# Patient Record
Sex: FEMALE | Race: WHITE | NOT HISPANIC OR LATINO | Employment: FULL TIME | URBAN - METROPOLITAN AREA
[De-identification: names, ages, dates, MRNs, and addresses within clinical notes are randomized per-mention and may not be internally consistent; named-entity substitution may affect disease eponyms.]

---

## 2021-03-12 ENCOUNTER — TRANSCRIBE ORDERS (OUTPATIENT)
Dept: ADMINISTRATIVE | Facility: HOSPITAL | Age: 61
End: 2021-03-12

## 2021-03-12 DIAGNOSIS — R00.0 TACHYCARDIA: ICD-10-CM

## 2021-03-12 DIAGNOSIS — R06.09 DOE (DYSPNEA ON EXERTION): Primary | ICD-10-CM

## 2021-03-16 ENCOUNTER — HOSPITAL ENCOUNTER (OUTPATIENT)
Dept: RESPIRATORY THERAPY | Facility: HOSPITAL | Age: 61
Discharge: HOME | End: 2021-03-16
Admitting: NURSE PRACTITIONER

## 2021-03-16 DIAGNOSIS — R06.09 DOE (DYSPNEA ON EXERTION): ICD-10-CM

## 2021-03-16 DIAGNOSIS — R00.0 TACHYCARDIA: ICD-10-CM

## 2021-03-16 PROCEDURE — 93225 XTRNL ECG REC<48 HRS REC: CPT

## 2021-03-19 PROCEDURE — 93227 XTRNL ECG REC<48 HR R&I: CPT | Performed by: INTERNAL MEDICINE

## 2023-02-24 ENCOUNTER — HOSPITAL ENCOUNTER (EMERGENCY)
Facility: HOSPITAL | Age: 63
Discharge: HOME | End: 2023-02-24
Attending: EMERGENCY MEDICINE | Admitting: EMERGENCY MEDICINE
Payer: COMMERCIAL

## 2023-02-24 VITALS
OXYGEN SATURATION: 98 % | TEMPERATURE: 98.1 F | DIASTOLIC BLOOD PRESSURE: 64 MMHG | WEIGHT: 209.88 LBS | HEART RATE: 60 BPM | RESPIRATION RATE: 17 BRPM | SYSTOLIC BLOOD PRESSURE: 139 MMHG | BODY MASS INDEX: 33.73 KG/M2 | HEIGHT: 66 IN

## 2023-02-24 DIAGNOSIS — R10.9 ACUTE LEFT FLANK PAIN: Primary | ICD-10-CM

## 2023-02-24 LAB
BACTERIA UR QL AUTO: ABNORMAL /HPF
BILIRUB UR QL STRIP: NEGATIVE
CLARITY UR: ABNORMAL
COLOR UR: YELLOW
GLUCOSE UR STRIP-MCNC: NEGATIVE MG/DL
HGB UR QL STRIP.AUTO: ABNORMAL
HYALINE CASTS UR QL AUTO: ABNORMAL /LPF
KETONES UR QL STRIP: ABNORMAL
LEUKOCYTE ESTERASE UR QL STRIP.AUTO: ABNORMAL
NITRITE UR QL STRIP: NEGATIVE
PH UR STRIP.AUTO: 5.5 [PH] (ref 5–8)
PROT UR QL STRIP: ABNORMAL
RBC # UR STRIP: ABNORMAL /HPF
REF LAB TEST METHOD: ABNORMAL
SP GR UR STRIP: >=1.03 (ref 1–1.03)
SQUAMOUS #/AREA URNS HPF: ABNORMAL /HPF
UROBILINOGEN UR QL STRIP: ABNORMAL
WBC # UR STRIP: ABNORMAL /HPF

## 2023-02-24 PROCEDURE — 99283 EMERGENCY DEPT VISIT LOW MDM: CPT

## 2023-02-24 PROCEDURE — 81001 URINALYSIS AUTO W/SCOPE: CPT | Performed by: EMERGENCY MEDICINE

## 2024-03-01 DIAGNOSIS — I10 ESSENTIAL HYPERTENSION: Primary | ICD-10-CM

## 2024-03-01 DIAGNOSIS — M51.36 DDD (DEGENERATIVE DISC DISEASE), LUMBAR: ICD-10-CM

## 2024-03-01 DIAGNOSIS — H81.09 MENIERE'S DISEASE, UNSPECIFIED LATERALITY: ICD-10-CM

## 2024-03-01 RX ORDER — LISINOPRIL AND HYDROCHLOROTHIAZIDE 20; 12.5 MG/1; MG/1
1 TABLET ORAL DAILY
COMMUNITY
Start: 2024-02-21

## 2024-03-01 RX ORDER — OMEPRAZOLE 20 MG/1
20 CAPSULE, DELAYED RELEASE ORAL DAILY
COMMUNITY

## 2024-03-01 RX ORDER — ASCORBIC ACID 500 MG
500 TABLET ORAL DAILY
COMMUNITY

## 2024-03-01 RX ORDER — SUCRALFATE 1 G/1
1 TABLET ORAL 4 TIMES DAILY
COMMUNITY

## 2024-03-01 RX ORDER — ZINC ACETATE 50 MG/1
50 CAPSULE ORAL DAILY
COMMUNITY

## 2024-03-06 ENCOUNTER — OFFICE VISIT (OUTPATIENT)
Dept: CARDIOLOGY | Facility: CLINIC | Age: 64
End: 2024-03-06
Payer: COMMERCIAL

## 2024-03-06 VITALS
WEIGHT: 195 LBS | HEIGHT: 66 IN | SYSTOLIC BLOOD PRESSURE: 135 MMHG | RESPIRATION RATE: 18 BRPM | BODY MASS INDEX: 31.34 KG/M2 | OXYGEN SATURATION: 97 % | DIASTOLIC BLOOD PRESSURE: 76 MMHG | HEART RATE: 58 BPM

## 2024-03-06 DIAGNOSIS — E11.9 TYPE 2 DIABETES MELLITUS WITHOUT COMPLICATION, WITHOUT LONG-TERM CURRENT USE OF INSULIN: ICD-10-CM

## 2024-03-06 DIAGNOSIS — R00.2 PALPITATIONS: ICD-10-CM

## 2024-03-06 DIAGNOSIS — I10 PRIMARY HYPERTENSION: Primary | ICD-10-CM

## 2024-03-06 RX ORDER — ATORVASTATIN CALCIUM 40 MG/1
40 TABLET, FILM COATED ORAL DAILY
Qty: 90 TABLET | Refills: 11 | Status: SHIPPED | OUTPATIENT
Start: 2024-03-06

## 2024-03-06 NOTE — PROGRESS NOTES
"      Subjective:     Encounter Date:03/06/2024      Patient ID: Zahira Sanders is a 64 y.o. female.    Chief Complaint:  Chief Complaint   Patient presents with    New Patient       HPI:    64-year-old female with a past medical history of diabetes mellitus-reported HbA1c 7%, hypertension presents to [facility] care  Patient mentions that she had multiple episodes of palpitations in the winter, random in onset, usually lasts for few minutes, not related to exertion.  However in the past 2 months she has not had any recurrence of palpitations and overall feels well.  Denies any chest pain or difficulty breathing with day-to-day activities.  Given history of diabetes mellitus, will start statin for primary ASCVD risk reduction.      Objective:         /76 (BP Location: Left arm, Patient Position: Sitting, Cuff Size: Large Adult)   Pulse 58   Resp 18   Ht 167.6 cm (66\")   Wt 88.5 kg (195 lb)   SpO2 97%   BMI 31.47 kg/m²     Physical exam  General-no acute distress  CVS-S1-S2 normal, no murmur  Respiratory-clear breath sounds  GI-soft nontender abdomen  No pedal edema  Alert oriented X3    ROS:  14 point review of systems negative except as mentioned above    Current Outpatient Medications:     Berberine Chloride (BERBERINE HCI PO), Take  by mouth., Disp: , Rfl:     lisinopril-hydrochlorothiazide (PRINZIDE,ZESTORETIC) 20-12.5 MG per tablet, Take 1 tablet by mouth Daily., Disp: , Rfl:     omeprazole (priLOSEC) 20 MG capsule, Take 1 capsule by mouth Daily., Disp: , Rfl:     atorvastatin (LIPITOR) 40 MG tablet, Take 1 tablet by mouth Daily., Disp: 90 tablet, Rfl: 11    Problem List:  There is no problem list on file for this patient.    Past Medical History:  History reviewed. No pertinent past medical history.  Past Surgical History:  History reviewed. No pertinent surgical history.  Social History:  Social History     Socioeconomic History    Marital status: Single   Tobacco Use    Smoking status: Never "   Vaping Use    Vaping status: Never Used   Substance and Sexual Activity    Alcohol use: Never    Drug use: Never     Allergies:  No Known Allergies  Immunizations:    There is no immunization history on file for this patient.         In-Office Procedure(s):  No orders to display        ASCVD RIsk Score::  The ASCVD Risk score (Sean ARTIS, et al., 2019) failed to calculate for the following reasons:    Cannot find a previous HDL lab    Cannot find a previous total cholesterol lab    Imaging:                   Most recent EKG as reviewed and interpreted by me:    ECG 12 Lead    Date/Time: 3/6/2024 12:48 PM  Performed by: Mickie Navas MD    Authorized by: Mickie Navas MD  Comparison: not compared with previous ECG   Previous ECG: no previous ECG available  Rhythm: sinus rhythm  QRS axis: left  Other findings: low voltage            Assessment & Plan :       Palpitations  Initial reason for consult however patient has not had any symptoms for about 2 months, no red flag signs  Plan  - Monitor for now  - Consider event monitor and echocardiogram if recurrence of symptoms    Diabetes mellitus type 2  Reported A1c 7% in the past  Start Lipitor 40 mg daily at bedtime for primary ASCVD risk reduction  Follow-up with primary care physician for management of diabetes    Hypertension  Blood pressure is normal today  Continue lisinopril-hydrochlorothiazide 20-12.5 mg p.o. daily      Part of this note may be an electronic transcription/translation of spoken language to printed text using the Dragon Dictation System.    Mickie Navas MD  03/06/24  .

## 2024-06-10 ENCOUNTER — TELEPHONE (OUTPATIENT)
Dept: CARDIOLOGY | Facility: CLINIC | Age: 64
End: 2024-06-10
Payer: COMMERCIAL

## 2025-04-24 ENCOUNTER — TRANSCRIBE ORDERS (OUTPATIENT)
Dept: ADMINISTRATIVE | Facility: HOSPITAL | Age: 65
End: 2025-04-24
Payer: COMMERCIAL

## 2025-04-24 DIAGNOSIS — Z13.6 SCREENING FOR CARDIOVASCULAR CONDITION: Primary | ICD-10-CM
